# Patient Record
Sex: MALE | Race: OTHER
[De-identification: names, ages, dates, MRNs, and addresses within clinical notes are randomized per-mention and may not be internally consistent; named-entity substitution may affect disease eponyms.]

---

## 2022-06-21 ENCOUNTER — HOSPITAL ENCOUNTER (INPATIENT)
Dept: HOSPITAL 54 - ER | Age: 62
LOS: 1 days | Discharge: HOME | DRG: 48 | End: 2022-06-22
Attending: INTERNAL MEDICINE | Admitting: INTERNAL MEDICINE
Payer: MEDICAID

## 2022-06-21 VITALS — DIASTOLIC BLOOD PRESSURE: 75 MMHG | SYSTOLIC BLOOD PRESSURE: 134 MMHG

## 2022-06-21 VITALS — BODY MASS INDEX: 27.58 KG/M2 | WEIGHT: 197 LBS | HEIGHT: 71 IN

## 2022-06-21 DIAGNOSIS — E11.43: Primary | ICD-10-CM

## 2022-06-21 DIAGNOSIS — D64.9: ICD-10-CM

## 2022-06-21 DIAGNOSIS — I99.8: ICD-10-CM

## 2022-06-21 DIAGNOSIS — E86.0: ICD-10-CM

## 2022-06-21 DIAGNOSIS — R42: ICD-10-CM

## 2022-06-21 DIAGNOSIS — Z90.49: ICD-10-CM

## 2022-06-21 DIAGNOSIS — E87.1: ICD-10-CM

## 2022-06-21 DIAGNOSIS — K31.84: ICD-10-CM

## 2022-06-21 DIAGNOSIS — Z89.422: ICD-10-CM

## 2022-06-21 DIAGNOSIS — K57.90: ICD-10-CM

## 2022-06-21 DIAGNOSIS — E11.40: ICD-10-CM

## 2022-06-21 DIAGNOSIS — L97.519: ICD-10-CM

## 2022-06-21 DIAGNOSIS — Z89.421: ICD-10-CM

## 2022-06-21 DIAGNOSIS — E11.621: ICD-10-CM

## 2022-06-21 LAB
ALBUMIN SERPL BCP-MCNC: 3.9 G/DL (ref 3.4–5)
ALP SERPL-CCNC: 67 U/L (ref 46–116)
ALT SERPL W P-5'-P-CCNC: 27 U/L (ref 12–78)
AST SERPL W P-5'-P-CCNC: 22 U/L (ref 15–37)
BASOPHILS # BLD AUTO: 0 K/UL (ref 0–0.2)
BASOPHILS NFR BLD AUTO: 0.3 % (ref 0–2)
BILIRUB DIRECT SERPL-MCNC: 0.2 MG/DL (ref 0–0.2)
BILIRUB SERPL-MCNC: 0.8 MG/DL (ref 0.2–1)
BILIRUB UR QL STRIP: NEGATIVE
BUN SERPL-MCNC: 22 MG/DL (ref 7–18)
CALCIUM SERPL-MCNC: 8.7 MG/DL (ref 8.5–10.1)
CHLORIDE SERPL-SCNC: 94 MMOL/L (ref 98–107)
CO2 SERPL-SCNC: 31 MMOL/L (ref 21–32)
COLOR UR: YELLOW
CREAT SERPL-MCNC: 1.3 MG/DL (ref 0.6–1.3)
EOSINOPHIL NFR BLD AUTO: 0 % (ref 0–6)
GLUCOSE SERPL-MCNC: 282 MG/DL (ref 74–106)
GLUCOSE UR STRIP-MCNC: >=1000 MG/DL
HCT VFR BLD AUTO: 37 % (ref 39–51)
HGB BLD-MCNC: 12.7 G/DL (ref 13.5–17.5)
LEUKOCYTE ESTERASE UR QL STRIP: NEGATIVE
LIPASE SERPL-CCNC: 83 U/L (ref 73–393)
LYMPHOCYTES NFR BLD AUTO: 1.6 K/UL (ref 0.8–4.8)
LYMPHOCYTES NFR BLD AUTO: 20.4 % (ref 20–44)
MCHC RBC AUTO-ENTMCNC: 34 G/DL (ref 31–36)
MCV RBC AUTO: 88 FL (ref 80–96)
MONOCYTES NFR BLD AUTO: 0.5 K/UL (ref 0.1–1.3)
MONOCYTES NFR BLD AUTO: 6.8 % (ref 2–12)
NEUTROPHILS # BLD AUTO: 5.7 K/UL (ref 1.8–8.9)
NEUTROPHILS NFR BLD AUTO: 72.5 % (ref 43–81)
NITRITE UR QL STRIP: NEGATIVE
PH UR STRIP: 7 [PH] (ref 5–8)
PLATELET # BLD AUTO: 237 K/UL (ref 150–450)
POTASSIUM SERPL-SCNC: 4.3 MMOL/L (ref 3.5–5.1)
PROT SERPL-MCNC: 8 G/DL (ref 6.4–8.2)
PROT UR QL STRIP: 100 MG/DL
RBC # BLD AUTO: 4.22 MIL/UL (ref 4.5–6)
RBC #/AREA URNS HPF: (no result) /HPF (ref 0–2)
SODIUM SERPL-SCNC: 130 MMOL/L (ref 136–145)
UROBILINOGEN UR STRIP-MCNC: 0.2 EU/DL
WBC #/AREA URNS HPF: (no result) /HPF (ref 0–3)
WBC NRBC COR # BLD AUTO: 7.9 K/UL (ref 4.3–11)

## 2022-06-21 PROCEDURE — G0378 HOSPITAL OBSERVATION PER HR: HCPCS

## 2022-06-21 NOTE — NUR
TO ER BED 11. BIB RA FROM HOME C/O NAUSEA AND VOMITING X1WEEK. PT ATTCHED TO 
MONITOR, VITALS ARE WITHIN NORMAL LIMITS. DR BARRETT AT BEDSIDE.

## 2022-06-21 NOTE — NUR
RN ADMITTING NOTE



PATIENT RECEIVED FROM ER FOR INTRACTABLE N/V. PATIENT A/O X 4, ABLE TO MAKE NEEDS KNOWN. 
PATIENT German SPEAKING ONLY. PATIENT STATES THAT HE'S HAD N/V X 4 DAYS, AND ALSO HAD IT IN 
THE PAST AFTER APPENDICITIS A FEW YEARS AGO. NO COMPLAINS OF NAUSEA AND NO VOMITING AT THIS 
TIME. PATIENT IS ON RA, TOLERATING WELL. NO SOB NOTED. NO S/S OF RESPIRATORY DISTRESS. 
PATIENT STATES THAT HE IS HOMELESS, PATIENT DOES NOT KNOW ANY OF HIS FAMILY MEMBER'S PHONE 
NUMBERS BUT STATES THEY LIVE CLOSE BY. PATIENT VACCINATED FOR COVID X 3. PATIENT AMBULATORY 
AND STEADY. SKIN ISSUES DOCUMENTED. BELONGINGS INVENTORIED. DM2 HX,  MG/DL. ORIENTED 
PATIENT TO ROOM, RN, AND CNA. SAFETY MEASURES IN PLACE: BED LOCKED AND IN LOWEST POSITION, 
CALL LIGHT WITHIN REACH, SIDE RAILS UP. WILL MONITOR PATIENT CLOSELY.

## 2022-06-22 LAB
BASOPHILS # BLD AUTO: 0 K/UL (ref 0–0.2)
BASOPHILS NFR BLD AUTO: 0.3 % (ref 0–2)
BUN SERPL-MCNC: 22 MG/DL (ref 7–18)
CALCIUM SERPL-MCNC: 8.3 MG/DL (ref 8.5–10.1)
CHLORIDE SERPL-SCNC: 99 MMOL/L (ref 98–107)
CO2 SERPL-SCNC: 30 MMOL/L (ref 21–32)
CREAT SERPL-MCNC: 1.2 MG/DL (ref 0.6–1.3)
EOSINOPHIL NFR BLD AUTO: 0.6 % (ref 0–6)
GLUCOSE SERPL-MCNC: 167 MG/DL (ref 74–106)
HCT VFR BLD AUTO: 36 % (ref 39–51)
HGB BLD-MCNC: 12.5 G/DL (ref 13.5–17.5)
LYMPHOCYTES NFR BLD AUTO: 3.2 K/UL (ref 0.8–4.8)
LYMPHOCYTES NFR BLD AUTO: 47.1 % (ref 20–44)
MAGNESIUM SERPL-MCNC: 1.6 MG/DL (ref 1.8–2.4)
MCHC RBC AUTO-ENTMCNC: 35 G/DL (ref 31–36)
MCV RBC AUTO: 88 FL (ref 80–96)
MONOCYTES NFR BLD AUTO: 0.5 K/UL (ref 0.1–1.3)
MONOCYTES NFR BLD AUTO: 8 % (ref 2–12)
NEUTROPHILS # BLD AUTO: 3 K/UL (ref 1.8–8.9)
NEUTROPHILS NFR BLD AUTO: 44 % (ref 43–81)
PHOSPHATE SERPL-MCNC: 2.2 MG/DL (ref 2.5–4.9)
PLATELET # BLD AUTO: 227 K/UL (ref 150–450)
POTASSIUM SERPL-SCNC: 3.6 MMOL/L (ref 3.5–5.1)
RBC # BLD AUTO: 4.05 MIL/UL (ref 4.5–6)
SODIUM SERPL-SCNC: 135 MMOL/L (ref 136–145)
WBC NRBC COR # BLD AUTO: 6.8 K/UL (ref 4.3–11)

## 2022-06-22 RX ADMIN — MAGNESIUM SULFATE IN DEXTROSE SCH MLS/HR: 10 INJECTION, SOLUTION INTRAVENOUS at 13:23

## 2022-06-22 RX ADMIN — Medication SCH EACH: at 11:55

## 2022-06-22 RX ADMIN — INSULIN HUMAN PRN UNITS: 100 INJECTION, SOLUTION PARENTERAL at 11:57

## 2022-06-22 RX ADMIN — METFORMIN HYDROCHLORIDE SCH MG: 500 TABLET, FILM COATED ORAL at 13:22

## 2022-06-22 RX ADMIN — Medication SCH EACH: at 17:16

## 2022-06-22 RX ADMIN — MAGNESIUM SULFATE IN DEXTROSE SCH MLS/HR: 10 INJECTION, SOLUTION INTRAVENOUS at 11:24

## 2022-06-22 RX ADMIN — METFORMIN HYDROCHLORIDE SCH MG: 500 TABLET, FILM COATED ORAL at 16:35

## 2022-06-22 RX ADMIN — INSULIN HUMAN PRN UNITS: 100 INJECTION, SOLUTION PARENTERAL at 17:17

## 2022-06-22 RX ADMIN — Medication SCH EACH: at 06:41

## 2022-06-22 NOTE — NUR
CAROLYNN Note:



Pt. Is a 61-year-old male who demonstrates adequate insight to the reason for 
hospitalization. Per EMR, pt. presents to the hospital for nausea and vomiting. Pt. was 
oriented x3, alert, and cooperative. During interview, pt. was capable of following 
directions and appeared unkempt. Pt.s speech was at a normal rate and pt.s mood was 
elevated. Pt. reported no hx of mental health, substance abuse, suicidal ideation, or 
homicidal ideation. Pt. denies auditory hallucinations, visual hallucinations, paranoia, or 
delusions. SW explored pt.s living situation. Per pt., he stays at a shelter but does not 
know the address or name. Pt. stated that is in-between [place] and [place]. Pt. stated that 
he might able to go back and that is where he wants to go. 



Plan: SW provided available resources and pt. accepted. Upon discharge, per pt., he will 
return to his shelter [Shelters number: [phone]]. KYLE provided pt. with a TAP card and 
waiver is placed in chart.



Resources Provided:

Year-round shelters: [facility] [address] [place] [address] [phone]; 
[facility] [address] [place] [address]; [facility] [address] 
[facility] [address] [phone]

Winter Shelters: 

[facility]

Provider: [facility]

Address: [address] [place], [address]

# of Beds: 47

Phone Number: [phone]

Population Served: [facility] 6 | [facility]

 

[place]

Provider: [facility]

Address: [address], [address] 

# of Beds: 66

Phone Number: [phone]

Population Served: [facility]

 

[facility] [place]

Provider: [facility]

Address: [address] [facility] [address] [facility], [address]

# of Beds: 56

Phone Number: [phone]

Population Served: [facility]

 

Antonino THOMAS. [place] 

Provider: /[facility]

Address: [address], [address]

# of Beds: 49

Phone Number: [phone]

Population Served: [place]

 

SPA 8 | [place]

[place]

Provider: [facility]

Address: [address]

# of Beds: 37

Phone Number: [phone]

Population Served: [place]



Hygiene: [facility]: [address] [place] [phone]; [facility] 
[address] [facility] [phone]; [facility] [address] [place] [phone].

Food Resources: [place] [facility] at [facility]- [address] 
[place]; Meet Each Need with Dignity ([facility]) [address] [facility] [address]om; [facility] [address] [facility]; [facility] 
[address] [facility]. 

Mental Health resources provided: [facility] [address] [place], [place] [address] [phone]; [facility] [address] [facility] [address], 
[place], [place] [address] [phone]; Carlin Y[facility] 
[address]av[address], [place] [address] [phone]; [facility] [address] 
[place], [place] [address] [phone]

Healthcare Clinics: [facility] [address] [facility], [address] [place]. 
[place] [phone]; [facility] [address] 
[place] [address]; [facility] [place] [address]. [place] [address] [phone]



Counseling--Outpatient

[facility]

[address] [address]

[place], [place] [address] [phone]

(Specializes in in-depth psychotherapy for emotional distress: anxiety, depression, 
interpersonal conflicts, life transitions, childhood abuse)



[facility] [address] [place] [address]

[phone]

(Assist with solving problem marital difficulties, separation & divorce, aging parents, 
death & grief, chronic & terminal illness)



[facility]

[address] [place] [address] [phone]

(Deal with loss & grief, anxiety, marital difficulties) 



Homebound/Mental Health Services

[address]

[place], [place] [address] [phone]

(Provide in-home mental services to people who are incapable of leaving their homes)



Organization for Needs of the Elderly

Senior Service/Resource Center

[address] [place] [address] [phone]



[facility] 

[address] [facility] [address].

[place] [address]

[phone]

PSYCHIATRIC OUTPATIENT SERVICES



[facility] Partial Hospitalization and Intensive Outpatient Program (Managed Care 
and [place] Only)[address] [facility] [phone]

[facility]

Partial Hospitalization and Outpatient [address]  [address]

[place], [place] [phone]

[facility] [place] Mhp[address]. [address]

[place], [place] [phone]

[facility] Partial Hospitalization and Outpatient 
[address] [place], .[phone]



Substance Abuse resources provided included: 

[facility] Substance Abuse Self-Helpline (SAS) [phone]; [facility] [address] 
[facility]. [place] [address] [phone]; [facility] [place] [address] 
[facility] [address] [phone]; [facility] [address] [facility] [address] [phone]; [facility] [address] [address] [phone];  [facility] [address] [place] [facility] [address] [phone]; [facility] [address] [facility] [address] [phone]; [facility] Substance Abuse Helpline(SAS)-[facility] [phone]; [facility] 
[facility]  [phone]; [facility] [phone] [place]; [facility]  
[phone] [place]; [facility]  [phone] [place]; [facility] 
Drug Abuse Recovery [phone] Alexandre Numisty; [place]  [phone] [place]; 
[facility] [phone] [place]; [facility] [phone] [facility]; [facility]. [phone] [place]; Alcoholics Anonymous [phone] [phone]-SFV; 
Gl[facility] [phone]; Marijuana Anonymous [phone]; Narcotics Anonymous 
www.na.org;

## 2022-06-22 NOTE — NUR
RN CLOSING NOTE



PATIENT A/O X 4, Nigerien SPEAKING, ABLE TO COMMUNICATE SOME ENGLISH. ON RA, TOLERATING WELL, 
NO RESPIRATORY DISTRESS NOTED. PATIENT JUST NOW COMPLAINING OF 3/10 PAIN ON THE UPPER ABD 
AREA, PAIN WITH PALPATION. PATIENT IS NPO AT THIS TIME, NO ORDER FOR IV MED AT THIS TIME. 
WILL NOTIFY MD AND DAY RN. PATIENT DOES NOT HAVE NAUSEA OR VOMITING PRESENT. PATIENTS  
MG/DL, NO COVERAGE GIVEN AS PATIENT NPO. PATIENT HAS A RAC 20 G WITH D5 1/2 NS ONGOING @ 75 
ML/HR. SAFETY MEASURES IMPLEMENTED: BED LOCKED AND IN LOWEST POSITION, CALL LIGHT WITHIN 
REACH, SIDE RAILS UP. WILL ENDORSE PATIENT TO DAY SHIFT RN FOR ANTONIO.

## 2022-06-22 NOTE — NUR
WOUND CARE CONSULT; PT PRESENTS WITH DISCOLORATION TO RT ANKLE WITH TENDERNESS AND DRY 
WOUND/DISCOLORATION TO RT GREAT TOE, PRESENT ON ADMISSION. PT HAS HISTORY OF TOE 
AMPUTATIONS. DPM CONSULT CALLED TO DR FAISAL MATTHEW IN AGREEMENT WITH PLAN OF CARE.

## 2022-06-22 NOTE — NUR
RN DISCHARGE NOTE



PATIENT DISCHARGE IN MEDICAL STABLE CONDITION. A/O X4. V/S TAKEN, STABLE RECORDED. NO IV 
ACCESS. PATIENT REFUSED SKIN ASSESSMENT AND PICTURES. NAME ARM BAND REMOVED. ALL BELONGINGS 
CHECKED AND SIGNED. HEALTH TEACHING AND DISCHARGE INSTRUCTIONS GIVEN AND VERBALIZED 
UNDERSTANDING. PATIENT WAS PROVIDED WITH RESOURCES FOR HOME SHELTERS, WOUND CARE CLINIC AND 
BUS CARD. PATIENT LEFT UNIT AMBULATING WITH NO SIGNS OF DISTRESS, ACCOMPANIED BY RN TO THE 
LOBBY. CHARGE NURSE AWARE OF DISCHARGE.

## 2022-06-22 NOTE — NUR
RN OPENING NOTE



PATIENT AWAKE IN BED RESTING, A/O X 4. NO S/S OF PAIN NOTED AT THIS TIME. ON ROOM AIR, NO 
DISTRESS OR SHORTNESS OF BREATH NOTED. IV ACCESS RAC #20G, INTACT, PATENT AND FLUSHING WELL. 
FALL AND SAFETY MEASURES IN PLACE, BED ALARM ON, BED IN LOW AND LOCK POSITION, CALL LIGHT 
AND TABLE WITHIN EASY REACH, SIDE RAILS UP X2. WILL CONTINUE TO MONITOR.

## 2022-08-10 ENCOUNTER — HOSPITAL ENCOUNTER (EMERGENCY)
Dept: HOSPITAL 54 - ER | Age: 62
Discharge: HOME | End: 2022-08-10
Payer: MEDICAID

## 2022-08-10 VITALS — SYSTOLIC BLOOD PRESSURE: 132 MMHG | DIASTOLIC BLOOD PRESSURE: 71 MMHG

## 2022-08-10 VITALS — WEIGHT: 175 LBS | HEIGHT: 71 IN | BODY MASS INDEX: 24.5 KG/M2

## 2022-08-10 DIAGNOSIS — R11.10: Primary | ICD-10-CM

## 2022-08-10 DIAGNOSIS — Z20.822: ICD-10-CM

## 2022-08-10 PROCEDURE — C9803 HOPD COVID-19 SPEC COLLECT: HCPCS

## 2022-08-10 PROCEDURE — 99283 EMERGENCY DEPT VISIT LOW MDM: CPT

## 2022-08-10 PROCEDURE — 87426 SARSCOV CORONAVIRUS AG IA: CPT

## 2022-08-24 ENCOUNTER — HOSPITAL ENCOUNTER (INPATIENT)
Dept: HOSPITAL 54 - ER | Age: 62
LOS: 2 days | Discharge: HOME | DRG: 282 | End: 2022-08-26
Attending: INTERNAL MEDICINE | Admitting: INTERNAL MEDICINE
Payer: MEDICAID

## 2022-08-24 VITALS — SYSTOLIC BLOOD PRESSURE: 147 MMHG | DIASTOLIC BLOOD PRESSURE: 8 MMHG

## 2022-08-24 VITALS — SYSTOLIC BLOOD PRESSURE: 166 MMHG | DIASTOLIC BLOOD PRESSURE: 94 MMHG

## 2022-08-24 VITALS — WEIGHT: 181 LBS | HEIGHT: 71 IN | BODY MASS INDEX: 25.34 KG/M2

## 2022-08-24 VITALS — SYSTOLIC BLOOD PRESSURE: 161 MMHG | DIASTOLIC BLOOD PRESSURE: 91 MMHG

## 2022-08-24 VITALS — SYSTOLIC BLOOD PRESSURE: 164 MMHG | DIASTOLIC BLOOD PRESSURE: 93 MMHG

## 2022-08-24 DIAGNOSIS — K85.90: Primary | ICD-10-CM

## 2022-08-24 DIAGNOSIS — E86.0: ICD-10-CM

## 2022-08-24 DIAGNOSIS — Z20.822: ICD-10-CM

## 2022-08-24 DIAGNOSIS — N28.89: ICD-10-CM

## 2022-08-24 DIAGNOSIS — E11.43: ICD-10-CM

## 2022-08-24 DIAGNOSIS — E83.42: ICD-10-CM

## 2022-08-24 DIAGNOSIS — K31.84: ICD-10-CM

## 2022-08-24 DIAGNOSIS — Z74.09: ICD-10-CM

## 2022-08-24 DIAGNOSIS — E87.1: ICD-10-CM

## 2022-08-24 DIAGNOSIS — K57.30: ICD-10-CM

## 2022-08-24 DIAGNOSIS — E87.6: ICD-10-CM

## 2022-08-24 DIAGNOSIS — Z83.3: ICD-10-CM

## 2022-08-24 LAB
ALBUMIN SERPL BCP-MCNC: 3.9 G/DL (ref 3.4–5)
ALP SERPL-CCNC: 103 U/L (ref 46–116)
ALT SERPL W P-5'-P-CCNC: 25 U/L (ref 12–78)
AST SERPL W P-5'-P-CCNC: 13 U/L (ref 15–37)
BASOPHILS # BLD AUTO: 0 K/UL (ref 0–0.2)
BASOPHILS NFR BLD AUTO: 0.2 % (ref 0–2)
BILIRUB DIRECT SERPL-MCNC: 0.1 MG/DL (ref 0–0.2)
BILIRUB SERPL-MCNC: 0.5 MG/DL (ref 0.2–1)
BILIRUB UR QL STRIP: NEGATIVE
BUN SERPL-MCNC: 24 MG/DL (ref 7–18)
CALCIUM SERPL-MCNC: 8.9 MG/DL (ref 8.5–10.1)
CHLORIDE SERPL-SCNC: 97 MMOL/L (ref 98–107)
CO2 SERPL-SCNC: 29 MMOL/L (ref 21–32)
COLOR UR: YELLOW
CREAT SERPL-MCNC: 1.4 MG/DL (ref 0.6–1.3)
DEPRECATED SQUAMOUS URNS QL MICRO: (no result) /HPF
EOSINOPHIL NFR BLD AUTO: 0.3 % (ref 0–6)
GLUCOSE SERPL-MCNC: 268 MG/DL (ref 74–106)
GLUCOSE UR STRIP-MCNC: >=1000 MG/DL
HCT VFR BLD AUTO: 35 % (ref 39–51)
HGB BLD-MCNC: 12.2 G/DL (ref 13.5–17.5)
LEUKOCYTE ESTERASE UR QL STRIP: NEGATIVE
LIPASE SERPL-CCNC: 713 U/L (ref 73–393)
LYMPHOCYTES NFR BLD AUTO: 1.4 K/UL (ref 0.8–4.8)
LYMPHOCYTES NFR BLD AUTO: 15.4 % (ref 20–44)
MCHC RBC AUTO-ENTMCNC: 35 G/DL (ref 31–36)
MCV RBC AUTO: 88 FL (ref 80–96)
MONOCYTES NFR BLD AUTO: 0.5 K/UL (ref 0.1–1.3)
MONOCYTES NFR BLD AUTO: 5 % (ref 2–12)
NEUTROPHILS # BLD AUTO: 7.4 K/UL (ref 1.8–8.9)
NEUTROPHILS NFR BLD AUTO: 79.1 % (ref 43–81)
NITRITE UR QL STRIP: NEGATIVE
PH UR STRIP: 6 [PH] (ref 5–8)
PLATELET # BLD AUTO: 319 K/UL (ref 150–450)
POTASSIUM SERPL-SCNC: 3.5 MMOL/L (ref 3.5–5.1)
PROT SERPL-MCNC: 8.4 G/DL (ref 6.4–8.2)
PROT UR QL STRIP: 30 MG/DL
RBC # BLD AUTO: 4.02 MIL/UL (ref 4.5–6)
RBC #/AREA URNS HPF: (no result) /HPF (ref 0–2)
SODIUM SERPL-SCNC: 135 MMOL/L (ref 136–145)
UROBILINOGEN UR STRIP-MCNC: 1 EU/DL
WBC #/AREA URNS HPF: (no result) /HPF (ref 0–3)
WBC NRBC COR # BLD AUTO: 9.4 K/UL (ref 4.3–11)

## 2022-08-24 PROCEDURE — C9113 INJ PANTOPRAZOLE SODIUM, VIA: HCPCS

## 2022-08-24 PROCEDURE — G0480 DRUG TEST DEF 1-7 CLASSES: HCPCS

## 2022-08-24 PROCEDURE — G0378 HOSPITAL OBSERVATION PER HR: HCPCS

## 2022-08-24 RX ADMIN — DEXTROSE MONOHYDRATE PRN MG: 50 INJECTION, SOLUTION INTRAVENOUS at 21:35

## 2022-08-24 RX ADMIN — Medication SCH EACH: at 18:25

## 2022-08-24 RX ADMIN — HYDRALAZINE HYDROCHLORIDE PRN MG: 20 INJECTION INTRAMUSCULAR; INTRAVENOUS at 20:01

## 2022-08-24 RX ADMIN — DEXTROSE AND SODIUM CHLORIDE PRN MLS/HR: 5; 450 INJECTION, SOLUTION INTRAVENOUS at 16:20

## 2022-08-24 NOTE — NUR
tele rn note 



patient is alert and oriented x4. patient is Omani speaking only. patient is complaining 
of nausea/vomiting. patient feels nausea and vomiting white, frothy phlegm. patient has iv 
site on right ac 18 gauge. iv patent and flushing well. patient skin intact. patient is 
current npo status. patient has scab, right bruise on right big toe. all safety measures in 
place. call light within. bed locked at lowest position. side railx up x2.will continue to 
assess throughout shift

## 2022-08-24 NOTE — NUR
RN OPEN NOTE:  A/O X4. VERBALLY RESPONSIVE Greek SPEAKING. RESTING IN BED.  IV FLUIDS 
RUNNING. FALL PRECAUTIONS MAINTAINED.  ABDOMEN IS SOFT NON DISTENDED. BOWEL SOUNDS PRESENT 
ALL FOUR QUADRANTS.  RIGHT GREAT TOE WITH DRY SCAB BLOOD WOUND.

## 2022-08-24 NOTE — NUR
tele rn closing note. 



patient is alert and oriented x4. patient is Cook Islander speaking. patient is complaining of 
nausea/vomiting. all needs met. patient has iv site on right ac 18 guage. iv patent and 
flushing well. patient has current D5 1/2 running at 75ml/hr. patient skin intact. patient 
is current npo status. patient has scab, right bruise on right big toe. all safety measures 
in place. call light within. bed locked at lowest position. side railx up x2.endorsed to 
night shift rn

## 2022-08-24 NOTE — NUR
checked blood sugar , blood sugar 246. notified  said to hold insulin due to npo.

blood pressure over systolic 166/94. notified .  ordered hydralazine 10 
mg iv push q6hr prn. 

-------------------------------------------------------------------------------

Addendum: 08/24/22 at 1949 by SANDHYA CLEANING RN

-------------------------------------------------------------------------------

dr. rice systolic bp >160 parameters

## 2022-08-25 VITALS — SYSTOLIC BLOOD PRESSURE: 148 MMHG | DIASTOLIC BLOOD PRESSURE: 79 MMHG

## 2022-08-25 VITALS — DIASTOLIC BLOOD PRESSURE: 62 MMHG | SYSTOLIC BLOOD PRESSURE: 113 MMHG

## 2022-08-25 VITALS — SYSTOLIC BLOOD PRESSURE: 113 MMHG | DIASTOLIC BLOOD PRESSURE: 70 MMHG

## 2022-08-25 VITALS — SYSTOLIC BLOOD PRESSURE: 129 MMHG | DIASTOLIC BLOOD PRESSURE: 68 MMHG

## 2022-08-25 LAB
ALBUMIN SERPL BCP-MCNC: 3.5 G/DL (ref 3.4–5)
ALP SERPL-CCNC: 70 U/L (ref 46–116)
ALT SERPL W P-5'-P-CCNC: 21 U/L (ref 12–78)
AST SERPL W P-5'-P-CCNC: 9 U/L (ref 15–37)
BASOPHILS # BLD AUTO: 0 K/UL (ref 0–0.2)
BASOPHILS NFR BLD AUTO: 0.2 % (ref 0–2)
BILIRUB DIRECT SERPL-MCNC: 0.1 MG/DL (ref 0–0.2)
BILIRUB SERPL-MCNC: 0.6 MG/DL (ref 0.2–1)
BUN SERPL-MCNC: 22 MG/DL (ref 7–18)
CALCIUM SERPL-MCNC: 8.5 MG/DL (ref 8.5–10.1)
CHLORIDE SERPL-SCNC: 97 MMOL/L (ref 98–107)
CO2 SERPL-SCNC: 28 MMOL/L (ref 21–32)
CREAT SERPL-MCNC: 1.3 MG/DL (ref 0.6–1.3)
EOSINOPHIL NFR BLD AUTO: 0.2 % (ref 0–6)
GLUCOSE SERPL-MCNC: 275 MG/DL (ref 74–106)
HCT VFR BLD AUTO: 35 % (ref 39–51)
HGB BLD-MCNC: 12.1 G/DL (ref 13.5–17.5)
LIPASE SERPL-CCNC: 292 U/L (ref 73–393)
LYMPHOCYTES NFR BLD AUTO: 1.6 K/UL (ref 0.8–4.8)
LYMPHOCYTES NFR BLD AUTO: 20.5 % (ref 20–44)
MAGNESIUM SERPL-MCNC: 1.6 MG/DL (ref 1.8–2.4)
MCHC RBC AUTO-ENTMCNC: 35 G/DL (ref 31–36)
MCV RBC AUTO: 88 FL (ref 80–96)
MONOCYTES NFR BLD AUTO: 0.4 K/UL (ref 0.1–1.3)
MONOCYTES NFR BLD AUTO: 5.5 % (ref 2–12)
NEUTROPHILS # BLD AUTO: 5.6 K/UL (ref 1.8–8.9)
NEUTROPHILS NFR BLD AUTO: 73.6 % (ref 43–81)
PHOSPHATE SERPL-MCNC: 2.8 MG/DL (ref 2.5–4.9)
PLATELET # BLD AUTO: 324 K/UL (ref 150–450)
POTASSIUM SERPL-SCNC: 3.5 MMOL/L (ref 3.5–5.1)
PROT SERPL-MCNC: 7.7 G/DL (ref 6.4–8.2)
RBC # BLD AUTO: 3.98 MIL/UL (ref 4.5–6)
SODIUM SERPL-SCNC: 134 MMOL/L (ref 136–145)
WBC NRBC COR # BLD AUTO: 7.6 K/UL (ref 4.3–11)

## 2022-08-25 RX ADMIN — INSULIN HUMAN PRN UNITS: 100 INJECTION, SOLUTION PARENTERAL at 17:16

## 2022-08-25 RX ADMIN — DEXTROSE AND SODIUM CHLORIDE PRN MLS/HR: 5; 450 INJECTION, SOLUTION INTRAVENOUS at 03:49

## 2022-08-25 RX ADMIN — Medication SCH EACH: at 17:17

## 2022-08-25 RX ADMIN — SODIUM CHLORIDE SCH MG: 9 INJECTION, SOLUTION INTRAVENOUS at 09:15

## 2022-08-25 RX ADMIN — Medication SCH EACH: at 06:31

## 2022-08-25 RX ADMIN — SODIUM CHLORIDE SCH MG: 9 INJECTION, SOLUTION INTRAVENOUS at 09:16

## 2022-08-25 RX ADMIN — HYDRALAZINE HYDROCHLORIDE PRN MG: 20 INJECTION INTRAMUSCULAR; INTRAVENOUS at 09:16

## 2022-08-25 RX ADMIN — Medication SCH EACH: at 12:08

## 2022-08-25 RX ADMIN — Medication SCH EACH: at 21:34

## 2022-08-25 RX ADMIN — Medication SCH EACH: at 00:07

## 2022-08-25 RX ADMIN — MAGNESIUM SULFATE IN DEXTROSE SCH MLS/HR: 10 INJECTION, SOLUTION INTRAVENOUS at 12:08

## 2022-08-25 RX ADMIN — Medication SCH EACH: at 12:25

## 2022-08-25 RX ADMIN — DEXTROSE AND SODIUM CHLORIDE PRN MLS/HR: 5; 450 INJECTION, SOLUTION INTRAVENOUS at 20:50

## 2022-08-25 RX ADMIN — DEXTROSE MONOHYDRATE PRN MG: 50 INJECTION, SOLUTION INTRAVENOUS at 04:26

## 2022-08-25 RX ADMIN — INSULIN HUMAN PRN UNITS: 100 INJECTION, SOLUTION PARENTERAL at 21:38

## 2022-08-25 RX ADMIN — SODIUM CHLORIDE SCH MG: 9 INJECTION, SOLUTION INTRAVENOUS at 14:38

## 2022-08-25 RX ADMIN — SODIUM CHLORIDE SCH MG: 9 INJECTION, SOLUTION INTRAVENOUS at 20:27

## 2022-08-25 RX ADMIN — MAGNESIUM SULFATE IN DEXTROSE SCH MLS/HR: 10 INJECTION, SOLUTION INTRAVENOUS at 10:45

## 2022-08-25 RX ADMIN — INSULIN HUMAN PRN UNITS: 100 INJECTION, SOLUTION PARENTERAL at 12:21

## 2022-08-25 NOTE — NUR
RN CLOSING NOTES



NO SIGNIFICANT CHANGES ON PATIENT CONDITION THROUGHOUT SHIFT.  PATIENT AWAKE, A/O X4. 
VERBALLY RESPONSIVE. ON ROOM AIR TOLERATING WELL. BREATHING UNLABORED. NO SOB OR ANY ACUTE 
DISTRESS NOTED. Tanzanian SPEAKING.  REMAINS ON NPO STATUS WITH IV ACCESS ON RAC #18G FLUIDS 
INFUSING D5 1/2 NS AT 75 ML PER HOUR, NO SIGNS OF INFILTRATION NOTED. ALL NEEDS ANTICIPATED. 
KEPT PATIENT CLEAN DRY AND COMFORTABLE. ALL DUE MEDS GIVEN AS ORDERED. ALL SAFETY MEASURES 
IN PLACE, BED ALARM ACTIVATED, CALL LIGHT WITHIN REACH, AND BED LOCKED AND LOWEST POSITION. 
WILL ENDORSE TO NIGHT SHIFT NURSE FOR CONTINUITY OF CARE.

## 2022-08-25 NOTE — NUR
CLOSING RN NOTE:  A/O X4. VERBALLY RESPONSIVE. Honduran SPEAKING. NPO.  IV FLUIDS RUNNING D5 
1/2 NS AT 75 ML PER HOUR. IV SITE INTACT. 18G.  ABDOMEN IS SOFT NON DISTENDED. BOWEL SOUNDS 
PRESENT TO ALL FOUR QUADRANTS OF ABDOMEN.  APRESOLINE IV TIMES ONE FOR /91 AT 2000, 
AFTER 1 HOUR /81 HR 90.  ZOFRAN IV GIVEN TIMES TWO FOR NAUSEA AND VOMITING WITH SHORT 
TIME LITTLE EFFECT. VOIDING YELLOW CLEAR URINE. CONTINENT OF SMALL BOWEL MOVEMENT.  BLOOD 
GLUCOSE AT THIS TIME  WITH NO COVERAGE.  LAURO MARTINEZ NP NOTIFIED PATIENT COMPLAINING OF 
CRUSHING PAIN ON RIGHT FLANK 10/10 COMES AND GOES. FAIR SKIN TURGOR MOIST ORAL MUCOSA. 
CONTINUE TO NOTE RIGHT TOE DRY BLOOD SCABBED WOUND.  WILL ENDORSE TO AM SHIFT RN.  

-------------------------------------------------------------------------------

Addendum: 08/25/22 at 0657 by KATERIN CORNELL LVN

-------------------------------------------------------------------------------

FALL PRECAUTIONS MAINTAINED.

## 2022-08-25 NOTE — NUR
RN NOTE



CHECKED BLOOD SUGAR RESULT 307 MG/DL. PATIENT CURRENTLY ON NPO STATUS. OBTAINED NEW ORDER 
PER DR. FARLEY FOR MILD SSI ACHS, NOTED AND CARRIED OUT.

## 2022-08-25 NOTE — NUR
RN OPENING NOTE



RECEIVED PATIENT A/O X4. VERBALLY RESPONSIVE. BREATHING ON ROOM AIR AT 98% WITH NO SOB OR 
DISTRESS. Romanian SPEAKING. NPO STATUS: IV FLUIDS RUNNING D5 1/2 NS AT 75 ML PER HOUR. RAC 
IV SITE INTACT AND PATENT#18G. ALL SAFETY MEASURES IN PLACE, BED ALARM ACTIVATED, CALL LIGHT 
WITHIN REACH, AND BED LOCKED AND LOWEST POSITION.

## 2022-08-25 NOTE — NUR
SS consult: 



SS Consult requested for homelessness. The pt. is a 61 -year-old Russian male patient who 
came in to the ED with complaints of abdominal pain and vomiting per EMR. Upon SS consult, 
the pt. is Alert & Oriented x 4 and makes good eye contact. The pt. appears well-groomed 
with euthymic mood & affect. Pt. denies SI/HI and denies hallucinations. SW explored pt.s 
living situation. Patient states he is currently experiencing homelessness for the past 5 
years and is currently residing at a shelter located at [[address] [address]; 
[phone]]. SW explored pt.s mental health Hx. and pt. denies any mental illness or 
taking medication. SW explored pt.s drug & ETOH use. Pt. denies any drug use and states he 
drinks alcohol at times but it is not a problem for him. Pt. states he is ambulatory and 
independent with all his ADLs. SW explored pt.s support system. Pt. states his daughter is 
his support system and refused to provide her information. Pt. reports he does not receive 
any financial assistance. 



Plan: Per pt., he would like to return to the shelter he was residing at [shelter located at 
[[address] [address]; [phone]]. Pt. will be provided a TAP card upon 
discharge and he stated he knows how to get there via bus. SW also provided pt. with 
homeless resources to shelter, food angel, showers etc. and pt. accepted resources. Pt. 
signed homeless waiver and it was placed in the pt.s chart. 





Year-round shelters: [facility] [place] [address] [place] [address] [phone]; 
[facility] [place] [address] [place] [address]; [facility] [address] 
[facility]. [facility] [address] [phone]

Hygiene: [place] [facility]: [address] [place] [phone]; [place] [facility] 
[address] [facility] [phone]; [facility] [address], [place] [phone].

Food Resources: [place] [facility] at hospitals- [address] 
[place]; Meet Each Need with Dignity ([facility]) [address] [facility]; [facility] Food Pantry [address] [facility] [facility] [place]; [facility] 
[address] [place]. 

Mental Health resources provided: [facility] [address] [place], [place] [address] [phone]; [facility] [address] [facility] [address], 
[place], [place] [address] [phone]; [facility] 
[address] [place], [place] [address] [phone]; [facility] [address] 
[place], [place] [address] [phone]

Healthcare Clinics: [facility] [address] [facility] [address], [address] [place]. 
[place] [phone]; [facility] [address] 
[place] [address]; [facility] [place] [address] [facility]. [place] [id number] [phone]



Counseling--Outpatient

[facility]

[address], [address]

[place], [place] [address] [phone]

(Specializes in in-depth psychotherapy for emotional distress: anxiety, depression, 
interpersonal conflicts, life transitions, childhood abuse)



[facility]

[address] [place], [place] [address] [phone]

(Assist with solving problem marital difficulties, separation & divorce, aging parents, 
death & grief, chronic & terminal illness)



[facility]

[address] [place] [address] [phone]

(Deal with loss & grief, anxiety, marital difficulties) 



Homebound/Mental Health Services

[address]

[place], [place] [address] [phone]

(Provide in-home mental services to people who are incapable of leaving their homes)



Organization for Needs of the Elderly

Senior Service/Resource Center

[address]

[place] [address]

[phone]



[facility] 

[address]

[place] [address]

[phone]

PSYCHIATRIC OUTPATIENT SERVICES



[facility] Partial Hospitalization and Intensive Outpatient Program (Managed Care 
and [place] Only)[address]

[facility] [phone]

[facility]

Partial Hospitalization and Outpatient [address]  [address]

[place], [place] [phone]

[facility] Health [place] Jag[address] [facility]. [address]

[place], [place] [phone]

[facility] Partial Hospitalization and Outpatient 
[address] [facility] Hospital[facility]y, [phone]



Substance Abuse resources provided included: [facility] Substance Abuse 
Self-Helpline (hospitals) [phone]; [facility] [address] [facility]. [place] [address] 
[phone]; [facility] [address] [facility] [address] [phone]; 
[facility] Rehabilitation Program [address] [facility] [address] [phone]; [facility] [address] [facility] [address] [phone];  [facility] [address] [facility] [address] [phone]; [facility] [address] [facility] [address] [phone]; [facility] Substance Abuse Helpline(hospitals)-[facility] [phone]; [facility]  
[phone]; [facility] [phone] [place]; [facility]  [phone] [place]; [facility]  [phone] [place]; [facility] Drug Abuse Recovery 
[phone] Alexandre Esau; [place] [facility]  [phone] [place]; [place] [phone] [place]; [facility] [phone] [place]; [facility]. 
[phone] [place]ung; Alcoholics Anonymous [phone]) 033-5935-SFV; [place] [phone]; Marijuana Anonymous [phone]) [phone]-SFV; Narcotics Anonymous www.[email or web address];

## 2022-08-26 VITALS — DIASTOLIC BLOOD PRESSURE: 70 MMHG | SYSTOLIC BLOOD PRESSURE: 125 MMHG

## 2022-08-26 LAB
BASOPHILS # BLD AUTO: 0 K/UL (ref 0–0.2)
BASOPHILS NFR BLD AUTO: 0.5 % (ref 0–2)
BUN SERPL-MCNC: 23 MG/DL (ref 7–18)
CALCIUM SERPL-MCNC: 8.5 MG/DL (ref 8.5–10.1)
CHLORIDE SERPL-SCNC: 98 MMOL/L (ref 98–107)
CO2 SERPL-SCNC: 28 MMOL/L (ref 21–32)
CREAT SERPL-MCNC: 1.2 MG/DL (ref 0.6–1.3)
EOSINOPHIL NFR BLD AUTO: 0.5 % (ref 0–6)
GLUCOSE SERPL-MCNC: 230 MG/DL (ref 74–106)
HCT VFR BLD AUTO: 33 % (ref 39–51)
HGB BLD-MCNC: 11.4 G/DL (ref 13.5–17.5)
LYMPHOCYTES NFR BLD AUTO: 2.7 K/UL (ref 0.8–4.8)
LYMPHOCYTES NFR BLD AUTO: 39.6 % (ref 20–44)
MAGNESIUM SERPL-MCNC: 1.9 MG/DL (ref 1.8–2.4)
MCHC RBC AUTO-ENTMCNC: 34 G/DL (ref 31–36)
MCV RBC AUTO: 88 FL (ref 80–96)
MONOCYTES NFR BLD AUTO: 0.4 K/UL (ref 0.1–1.3)
MONOCYTES NFR BLD AUTO: 6.5 % (ref 2–12)
NEUTROPHILS # BLD AUTO: 3.6 K/UL (ref 1.8–8.9)
NEUTROPHILS NFR BLD AUTO: 52.9 % (ref 43–81)
PHOSPHATE SERPL-MCNC: 2.5 MG/DL (ref 2.5–4.9)
PLATELET # BLD AUTO: 316 K/UL (ref 150–450)
POTASSIUM SERPL-SCNC: 3.4 MMOL/L (ref 3.5–5.1)
RBC # BLD AUTO: 3.78 MIL/UL (ref 4.5–6)
SODIUM SERPL-SCNC: 133 MMOL/L (ref 136–145)
WBC NRBC COR # BLD AUTO: 6.9 K/UL (ref 4.3–11)

## 2022-08-26 RX ADMIN — INSULIN HUMAN PRN UNITS: 100 INJECTION, SOLUTION PARENTERAL at 08:07

## 2022-08-26 RX ADMIN — Medication SCH EACH: at 08:01

## 2022-08-26 RX ADMIN — SODIUM CHLORIDE SCH MG: 9 INJECTION, SOLUTION INTRAVENOUS at 07:48

## 2022-08-26 RX ADMIN — SODIUM CHLORIDE SCH MG: 9 INJECTION, SOLUTION INTRAVENOUS at 09:00

## 2022-08-26 RX ADMIN — SODIUM CHLORIDE SCH MG: 9 INJECTION, SOLUTION INTRAVENOUS at 03:04

## 2022-08-26 RX ADMIN — POTASSIUM CHLORIDE SCH MLS/HR: 200 INJECTION, SOLUTION INTRAVENOUS at 10:24

## 2022-08-26 RX ADMIN — POTASSIUM CHLORIDE SCH MLS/HR: 200 INJECTION, SOLUTION INTRAVENOUS at 11:47

## 2022-08-26 RX ADMIN — INSULIN HUMAN PRN UNITS: 100 INJECTION, SOLUTION PARENTERAL at 12:30

## 2022-08-26 RX ADMIN — DEXTROSE AND SODIUM CHLORIDE PRN MLS/HR: 5; 450 INJECTION, SOLUTION INTRAVENOUS at 09:09

## 2022-08-26 RX ADMIN — Medication SCH EACH: at 12:28

## 2022-08-26 NOTE — NUR
RN OPENING NOTES:



RECEIVED PATIENT IN BED, ASLEEP BUT EASILY AROUSES TO VOICE AND TOUCH.  PATIENT IS Lao 
SPEAKING BUT IS ALERT, ORIENTED X 4.  ON RA WITH OXYGEN SATURATION OF 98%.  IV ACCESS ON 
RIGHT ANTECUBITAL # 18 RUNNING WITH D51/2 NS @ 75 ML/HR.  IV SITE, PATENT , NO S/S OF 
INFILTRATION.    NO N/V NOTED AT THIS TIME BUT WILL CONTINUE TO MONITOR.  SR UP X 2, BED 
LOCKED AND IN LOWEST POSITION, CALL LIGHT WITHIN REACH.  ALL SAFETY MEASURES IMPLEMENTED.  
WILL CONTINUE TO MONITOR PATIENT THROUGHOUT SHIFT.

## 2022-08-26 NOTE — NUR
tle rn note 

 per dr rice ok to   discharge , patient going to shelter , tap card for bus given , 
instructed to f\u with primary care doctor and return for worsening symptoms, hl removed dry 
dressing  applied ,no bleeding noted  belonging checked taken to lobby on  w\c with stable 
condition with cna  ,shelter information given by  ,

## 2022-08-26 NOTE — NUR
RN CLOSING NOTE:  ALERT AND ORIENTED TIMES FOUR.  MOIST ORAL MUCOSA.  FAIR SKIN TURGOR.  
NPO. NO N/V. NO C/O PAIN OR DISCOMFORT THROUGHOUT SHIFT.  IV FLUIDS RUNNING, IV SITE RIGHT 
ANTECUBITAL WITH NO S/S OF COMPLICATIONS.  CONTINUES WITH ROUTINE IV REGLAN B9ALLRY. ABLE OT 
SLEEP WELL.  RIGHT GREAT TOE WOUND DENEEN. BLOOD GLUCOSE CHECKED AND COVERED PER SLIDING SCALE. 
FALL PRECAUTIONS MAINTAINED.  CALL LIGHT WITHIN REACH.

## 2022-08-26 NOTE — NUR
PATIENT HAS NOT HAD ANY EPISODES OF NAUSEA AND VOMITING SINCE THE BEGINNING OF THE SHIFT 
UNTIL NOW AND SINCE YESTERDAY PER NIGHT SHIFT NURSE.  CONTACTED DR. RODRIGEZ WITH AN ORDER 
FOR CLEAR LIQUID DIET.  ORDER NOTED AND CARRIED OUT.